# Patient Record
Sex: FEMALE | Race: WHITE | Employment: FULL TIME | URBAN - NONMETROPOLITAN AREA
[De-identification: names, ages, dates, MRNs, and addresses within clinical notes are randomized per-mention and may not be internally consistent; named-entity substitution may affect disease eponyms.]

---

## 2019-01-07 ENCOUNTER — HOSPITAL ENCOUNTER (OUTPATIENT)
Dept: ULTRASOUND IMAGING | Age: 29
Discharge: HOME | End: 2019-01-07
Payer: COMMERCIAL

## 2019-01-07 DIAGNOSIS — R10.9 LEFT SIDED ABDOMINAL PAIN: ICD-10-CM

## 2019-01-07 DIAGNOSIS — Z3A.26 26 WEEKS GESTATION OF PREGNANCY: ICD-10-CM

## 2019-01-07 DIAGNOSIS — R10.9 STOMACH ACHE: ICD-10-CM

## 2019-01-07 PROCEDURE — 76815 OB US LIMITED FETUS(S): CPT

## 2019-04-01 PROBLEM — O47.9 FALSE LABOR: Status: ACTIVE | Noted: 2019-04-01

## 2019-04-04 ENCOUNTER — APPOINTMENT (OUTPATIENT)
Dept: LABOR AND DELIVERY | Age: 29
End: 2019-04-04
Payer: COMMERCIAL

## 2019-04-04 ENCOUNTER — ANESTHESIA EVENT (OUTPATIENT)
Dept: LABOR AND DELIVERY | Age: 29
End: 2019-04-04
Payer: COMMERCIAL

## 2019-04-04 ENCOUNTER — ANESTHESIA (OUTPATIENT)
Dept: LABOR AND DELIVERY | Age: 29
End: 2019-04-04
Payer: COMMERCIAL

## 2019-04-04 ENCOUNTER — HOSPITAL ENCOUNTER (INPATIENT)
Age: 29
LOS: 2 days | Discharge: HOME | End: 2019-04-06
Attending: OBSTETRICS & GYNECOLOGY | Admitting: OBSTETRICS & GYNECOLOGY
Payer: COMMERCIAL

## 2019-04-04 LAB
ABO: NORMAL
AMPHETAMINE+METHAMPHETAMINE URINE SCREEN: NEGATIVE
ANTIBODY SCREEN: NORMAL
BARBITURATE QUANTITATIVE URINE: NEGATIVE
BASOPHILS # BLD: 0.4 %
BASOPHILS ABSOLUTE: 0 THOU/MM3 (ref 0–0.1)
BENZODIAZEPINE QUANTITATIVE URINE: NEGATIVE
CANNABINOID QUANTITATIVE URINE: NEGATIVE
COCAINE METABOLITE QUANTITATIVE URINE: NEGATIVE
EOSINOPHIL # BLD: 1.1 %
EOSINOPHILS ABSOLUTE: 0.1 THOU/MM3 (ref 0–0.4)
ERYTHROCYTE [DISTWIDTH] IN BLOOD BY AUTOMATED COUNT: 16.2 % (ref 11.5–14.5)
ERYTHROCYTE [DISTWIDTH] IN BLOOD BY AUTOMATED COUNT: 49.5 FL (ref 35–45)
HCT VFR BLD CALC: 33.3 % (ref 37–47)
HEMOGLOBIN: 10.2 GM/DL (ref 12–16)
IMMATURE GRANS (ABS): 0.05 THOU/MM3 (ref 0–0.07)
IMMATURE GRANULOCYTES: 0.4 %
LYMPHOCYTES # BLD: 19.9 %
LYMPHOCYTES ABSOLUTE: 2.2 THOU/MM3 (ref 1–4.8)
MCH RBC QN AUTO: 25.8 PG (ref 26–33)
MCHC RBC AUTO-ENTMCNC: 30.6 GM/DL (ref 32.2–35.5)
MCV RBC AUTO: 84.3 FL (ref 81–99)
MONOCYTES # BLD: 9.8 %
MONOCYTES ABSOLUTE: 1.1 THOU/MM3 (ref 0.4–1.3)
NUCLEATED RED BLOOD CELLS: 0 /100 WBC
OPIATES, URINE: NEGATIVE
OXYCODONE: NEGATIVE
PHENCYCLIDINE QUANTITATIVE URINE: NEGATIVE
PLATELET # BLD: 330 THOU/MM3 (ref 130–400)
PMV BLD AUTO: 9.9 FL (ref 9.4–12.4)
RBC # BLD: 3.95 MILL/MM3 (ref 4.2–5.4)
RH FACTOR: NORMAL
RPR: NONREACTIVE
SEG NEUTROPHILS: 68.4 %
SEGMENTED NEUTROPHILS ABSOLUTE COUNT: 7.6 THOU/MM3 (ref 1.8–7.7)
WBC # BLD: 11.1 THOU/MM3 (ref 4.8–10.8)

## 2019-04-04 PROCEDURE — 86900 BLOOD TYPING SEROLOGIC ABO: CPT

## 2019-04-04 PROCEDURE — 4A1HXCZ MONITORING OF PRODUCTS OF CONCEPTION, CARDIAC RATE, EXTERNAL APPROACH: ICD-10-PCS | Performed by: OBSTETRICS & GYNECOLOGY

## 2019-04-04 PROCEDURE — 86592 SYPHILIS TEST NON-TREP QUAL: CPT

## 2019-04-04 PROCEDURE — 80305 DRUG TEST PRSMV DIR OPT OBS: CPT

## 2019-04-04 PROCEDURE — 36415 COLL VENOUS BLD VENIPUNCTURE: CPT

## 2019-04-04 PROCEDURE — 86850 RBC ANTIBODY SCREEN: CPT

## 2019-04-04 PROCEDURE — 85025 COMPLETE CBC W/AUTO DIFF WBC: CPT

## 2019-04-04 PROCEDURE — 3E033VJ INTRODUCTION OF OTHER HORMONE INTO PERIPHERAL VEIN, PERCUTANEOUS APPROACH: ICD-10-PCS | Performed by: OBSTETRICS & GYNECOLOGY

## 2019-04-04 PROCEDURE — 3700000025 EPIDURAL BLOCK: Performed by: ANESTHESIOLOGY

## 2019-04-04 PROCEDURE — 10907ZC DRAINAGE OF AMNIOTIC FLUID, THERAPEUTIC FROM PRODUCTS OF CONCEPTION, VIA NATURAL OR ARTIFICIAL OPENING: ICD-10-PCS | Performed by: OBSTETRICS & GYNECOLOGY

## 2019-04-04 PROCEDURE — 2500000003 HC RX 250 WO HCPCS: Performed by: ANESTHESIOLOGY

## 2019-04-04 PROCEDURE — 1200000000 HC SEMI PRIVATE

## 2019-04-04 PROCEDURE — 86901 BLOOD TYPING SEROLOGIC RH(D): CPT

## 2019-04-04 PROCEDURE — 2709999900 HC NON-CHARGEABLE SUPPLY

## 2019-04-04 PROCEDURE — 6360000002 HC RX W HCPCS

## 2019-04-04 PROCEDURE — 2500000003 HC RX 250 WO HCPCS

## 2019-04-04 PROCEDURE — 7200000001 HC VAGINAL DELIVERY

## 2019-04-04 PROCEDURE — 6370000000 HC RX 637 (ALT 250 FOR IP): Performed by: OBSTETRICS & GYNECOLOGY

## 2019-04-04 PROCEDURE — 2580000003 HC RX 258: Performed by: OBSTETRICS & GYNECOLOGY

## 2019-04-04 RX ORDER — SODIUM CHLORIDE 0.9 % (FLUSH) 0.9 %
10 SYRINGE (ML) INJECTION EVERY 12 HOURS SCHEDULED
Status: DISCONTINUED | OUTPATIENT
Start: 2019-04-04 | End: 2019-04-04

## 2019-04-04 RX ORDER — DIPHENHYDRAMINE HYDROCHLORIDE 50 MG/ML
25 INJECTION INTRAMUSCULAR; INTRAVENOUS EVERY 4 HOURS PRN
Status: DISCONTINUED | OUTPATIENT
Start: 2019-04-04 | End: 2019-04-04 | Stop reason: HOSPADM

## 2019-04-04 RX ORDER — CARBOPROST TROMETHAMINE 250 UG/ML
250 INJECTION, SOLUTION INTRAMUSCULAR
Status: DISPENSED | OUTPATIENT
Start: 2019-04-04 | End: 2019-04-04

## 2019-04-04 RX ORDER — SODIUM CHLORIDE, SODIUM LACTATE, POTASSIUM CHLORIDE, CALCIUM CHLORIDE 600; 310; 30; 20 MG/100ML; MG/100ML; MG/100ML; MG/100ML
INJECTION, SOLUTION INTRAVENOUS CONTINUOUS
Status: DISCONTINUED | OUTPATIENT
Start: 2019-04-04 | End: 2019-04-06 | Stop reason: HOSPADM

## 2019-04-04 RX ORDER — ONDANSETRON 2 MG/ML
4 INJECTION INTRAMUSCULAR; INTRAVENOUS EVERY 6 HOURS PRN
Status: DISCONTINUED | OUTPATIENT
Start: 2019-04-04 | End: 2019-04-04

## 2019-04-04 RX ORDER — ONDANSETRON 2 MG/ML
4 INJECTION INTRAMUSCULAR; INTRAVENOUS EVERY 6 HOURS PRN
Status: DISCONTINUED | OUTPATIENT
Start: 2019-04-04 | End: 2019-04-06 | Stop reason: HOSPADM

## 2019-04-04 RX ORDER — MORPHINE SULFATE 2 MG/ML
2 INJECTION, SOLUTION INTRAMUSCULAR; INTRAVENOUS
Status: DISCONTINUED | OUTPATIENT
Start: 2019-04-04 | End: 2019-04-06 | Stop reason: HOSPADM

## 2019-04-04 RX ORDER — ONDANSETRON 4 MG/1
8 TABLET, FILM COATED ORAL EVERY 8 HOURS PRN
Status: DISCONTINUED | OUTPATIENT
Start: 2019-04-04 | End: 2019-04-06 | Stop reason: HOSPADM

## 2019-04-04 RX ORDER — MORPHINE SULFATE 4 MG/ML
4 INJECTION, SOLUTION INTRAMUSCULAR; INTRAVENOUS
Status: DISCONTINUED | OUTPATIENT
Start: 2019-04-04 | End: 2019-04-06 | Stop reason: HOSPADM

## 2019-04-04 RX ORDER — LANOLIN 100 %
OINTMENT (GRAM) TOPICAL PRN
Status: DISCONTINUED | OUTPATIENT
Start: 2019-04-04 | End: 2019-04-06 | Stop reason: HOSPADM

## 2019-04-04 RX ORDER — SODIUM CHLORIDE, SODIUM LACTATE, POTASSIUM CHLORIDE, CALCIUM CHLORIDE 600; 310; 30; 20 MG/100ML; MG/100ML; MG/100ML; MG/100ML
INJECTION, SOLUTION INTRAVENOUS CONTINUOUS
Status: DISCONTINUED | OUTPATIENT
Start: 2019-04-04 | End: 2019-04-04

## 2019-04-04 RX ORDER — CARBOPROST TROMETHAMINE 250 UG/ML
250 INJECTION, SOLUTION INTRAMUSCULAR PRN
Status: CANCELLED | OUTPATIENT
Start: 2019-04-04

## 2019-04-04 RX ORDER — ONDANSETRON 2 MG/ML
8 INJECTION INTRAMUSCULAR; INTRAVENOUS EVERY 6 HOURS PRN
Status: DISCONTINUED | OUTPATIENT
Start: 2019-04-04 | End: 2019-04-04 | Stop reason: HOSPADM

## 2019-04-04 RX ORDER — NALBUPHINE HCL 10 MG/ML
5 AMPUL (ML) INJECTION EVERY 4 HOURS PRN
Status: DISCONTINUED | OUTPATIENT
Start: 2019-04-04 | End: 2019-04-04 | Stop reason: HOSPADM

## 2019-04-04 RX ORDER — MORPHINE SULFATE 2 MG/ML
2 INJECTION, SOLUTION INTRAMUSCULAR; INTRAVENOUS
Status: DISCONTINUED | OUTPATIENT
Start: 2019-04-04 | End: 2019-04-04 | Stop reason: HOSPADM

## 2019-04-04 RX ORDER — TERBUTALINE SULFATE 1 MG/ML
0.25 INJECTION, SOLUTION SUBCUTANEOUS ONCE
Status: DISCONTINUED | OUTPATIENT
Start: 2019-04-04 | End: 2019-04-04 | Stop reason: HOSPADM

## 2019-04-04 RX ORDER — BUTORPHANOL TARTRATE 1 MG/ML
1 INJECTION, SOLUTION INTRAMUSCULAR; INTRAVENOUS
Status: DISCONTINUED | OUTPATIENT
Start: 2019-04-04 | End: 2019-04-04 | Stop reason: HOSPADM

## 2019-04-04 RX ORDER — LIDOCAINE HYDROCHLORIDE AND EPINEPHRINE 15; 5 MG/ML; UG/ML
INJECTION, SOLUTION EPIDURAL
Status: DISCONTINUED
Start: 2019-04-04 | End: 2019-04-04

## 2019-04-04 RX ORDER — ACETAMINOPHEN 325 MG/1
650 TABLET ORAL EVERY 4 HOURS PRN
Status: DISCONTINUED | OUTPATIENT
Start: 2019-04-04 | End: 2019-04-04 | Stop reason: HOSPADM

## 2019-04-04 RX ORDER — METHYLERGONOVINE MALEATE 0.2 MG/ML
200 INJECTION INTRAVENOUS
Status: ACTIVE | OUTPATIENT
Start: 2019-04-04 | End: 2019-04-04

## 2019-04-04 RX ORDER — LIDOCAINE HYDROCHLORIDE 10 MG/ML
30 INJECTION, SOLUTION EPIDURAL; INFILTRATION; INTRACAUDAL; PERINEURAL PRN
Status: DISCONTINUED | OUTPATIENT
Start: 2019-04-04 | End: 2019-04-04 | Stop reason: HOSPADM

## 2019-04-04 RX ORDER — SEVOFLURANE 250 ML/250ML
1 LIQUID RESPIRATORY (INHALATION) CONTINUOUS PRN
Status: DISCONTINUED | OUTPATIENT
Start: 2019-04-04 | End: 2019-04-04 | Stop reason: HOSPADM

## 2019-04-04 RX ORDER — METHYLERGONOVINE MALEATE 0.2 MG/ML
200 INJECTION INTRAVENOUS PRN
Status: DISCONTINUED | OUTPATIENT
Start: 2019-04-04 | End: 2019-04-04 | Stop reason: HOSPADM

## 2019-04-04 RX ORDER — HYDROCODONE BITARTRATE AND ACETAMINOPHEN 5; 325 MG/1; MG/1
1 TABLET ORAL EVERY 4 HOURS PRN
Status: DISCONTINUED | OUTPATIENT
Start: 2019-04-04 | End: 2019-04-06 | Stop reason: HOSPADM

## 2019-04-04 RX ORDER — HYDROCODONE BITARTRATE AND ACETAMINOPHEN 5; 325 MG/1; MG/1
2 TABLET ORAL EVERY 4 HOURS PRN
Status: DISCONTINUED | OUTPATIENT
Start: 2019-04-04 | End: 2019-04-06 | Stop reason: HOSPADM

## 2019-04-04 RX ORDER — EPHEDRINE SULFATE/0.9% NACL/PF 50 MG/5 ML
SYRINGE (ML) INTRAVENOUS
Status: COMPLETED
Start: 2019-04-04 | End: 2019-04-04

## 2019-04-04 RX ORDER — SODIUM CHLORIDE 0.9 % (FLUSH) 0.9 %
10 SYRINGE (ML) INJECTION PRN
Status: DISCONTINUED | OUTPATIENT
Start: 2019-04-04 | End: 2019-04-04

## 2019-04-04 RX ORDER — MISOPROSTOL 200 UG/1
800 TABLET ORAL
Status: ACTIVE | OUTPATIENT
Start: 2019-04-04 | End: 2019-04-04

## 2019-04-04 RX ORDER — FERROUS SULFATE 325(65) MG
325 TABLET ORAL
Status: DISCONTINUED | OUTPATIENT
Start: 2019-04-05 | End: 2019-04-06 | Stop reason: HOSPADM

## 2019-04-04 RX ORDER — DOCUSATE SODIUM 100 MG/1
100 CAPSULE, LIQUID FILLED ORAL 2 TIMES DAILY PRN
Status: DISCONTINUED | OUTPATIENT
Start: 2019-04-04 | End: 2019-04-06 | Stop reason: HOSPADM

## 2019-04-04 RX ORDER — NALOXONE HYDROCHLORIDE 0.4 MG/ML
0.4 INJECTION, SOLUTION INTRAMUSCULAR; INTRAVENOUS; SUBCUTANEOUS PRN
Status: DISCONTINUED | OUTPATIENT
Start: 2019-04-04 | End: 2019-04-04 | Stop reason: HOSPADM

## 2019-04-04 RX ORDER — MISOPROSTOL 200 UG/1
1000 TABLET ORAL PRN
Status: DISCONTINUED | OUTPATIENT
Start: 2019-04-04 | End: 2019-04-04 | Stop reason: HOSPADM

## 2019-04-04 RX ORDER — MORPHINE SULFATE 4 MG/ML
4 INJECTION, SOLUTION INTRAMUSCULAR; INTRAVENOUS
Status: DISCONTINUED | OUTPATIENT
Start: 2019-04-04 | End: 2019-04-04 | Stop reason: HOSPADM

## 2019-04-04 RX ORDER — IBUPROFEN 800 MG/1
800 TABLET ORAL EVERY 8 HOURS PRN
Status: DISCONTINUED | OUTPATIENT
Start: 2019-04-04 | End: 2019-04-04 | Stop reason: HOSPADM

## 2019-04-04 RX ORDER — LIDOCAINE HYDROCHLORIDE AND EPINEPHRINE 10; 10 MG/ML; UG/ML
INJECTION, SOLUTION INFILTRATION; PERINEURAL
Status: DISCONTINUED
Start: 2019-04-04 | End: 2019-04-04

## 2019-04-04 RX ORDER — ACETAMINOPHEN 325 MG/1
650 TABLET ORAL EVERY 4 HOURS PRN
Status: DISCONTINUED | OUTPATIENT
Start: 2019-04-04 | End: 2019-04-06 | Stop reason: HOSPADM

## 2019-04-04 RX ORDER — IBUPROFEN 800 MG/1
800 TABLET ORAL 3 TIMES DAILY
Status: DISCONTINUED | OUTPATIENT
Start: 2019-04-04 | End: 2019-04-06 | Stop reason: HOSPADM

## 2019-04-04 RX ADMIN — SODIUM CHLORIDE, POTASSIUM CHLORIDE, SODIUM LACTATE AND CALCIUM CHLORIDE: 600; 310; 30; 20 INJECTION, SOLUTION INTRAVENOUS at 08:30

## 2019-04-04 RX ADMIN — Medication 1 MILLI-UNITS/MIN: at 08:44

## 2019-04-04 RX ADMIN — SODIUM CHLORIDE, POTASSIUM CHLORIDE, SODIUM LACTATE AND CALCIUM CHLORIDE: 600; 310; 30; 20 INJECTION, SOLUTION INTRAVENOUS at 13:47

## 2019-04-04 RX ADMIN — DOCUSATE SODIUM 100 MG: 100 CAPSULE, LIQUID FILLED ORAL at 20:01

## 2019-04-04 RX ADMIN — IBUPROFEN 800 MG: 800 TABLET, FILM COATED ORAL at 20:35

## 2019-04-04 RX ADMIN — Medication 999 MILLI-UNITS/MIN: at 15:22

## 2019-04-04 RX ADMIN — Medication 10 MG: at 11:40

## 2019-04-04 RX ADMIN — SODIUM CHLORIDE, POTASSIUM CHLORIDE, SODIUM LACTATE AND CALCIUM CHLORIDE: 600; 310; 30; 20 INJECTION, SOLUTION INTRAVENOUS at 12:17

## 2019-04-04 RX ADMIN — SODIUM CHLORIDE, POTASSIUM CHLORIDE, SODIUM LACTATE AND CALCIUM CHLORIDE: 600; 310; 30; 20 INJECTION, SOLUTION INTRAVENOUS at 11:17

## 2019-04-04 RX ADMIN — Medication 18 ML/HR: at 11:15

## 2019-04-04 SDOH — HEALTH STABILITY: MENTAL HEALTH: HOW OFTEN DO YOU HAVE A DRINK CONTAINING ALCOHOL?: NEVER

## 2019-04-04 NOTE — FLOWSHEET NOTE
Pt arrived to room 5a16 per wc with infant in arms. Oriented to room and plan of care discussed. Pt denies needs, will call for next void. Call light is within reach.

## 2019-04-04 NOTE — PLAN OF CARE
Problem:  Screening:  Goal: Ability to make informed decisions regarding treatment has improved  Description  Ability to make informed decisions regarding treatment has improved  Outcome: Met This Shift  Note:   NA     Problem:  Screening:  Goal: Ability to make informed decisions regarding treatment has improved  Description  Ability to make informed decisions regarding treatment has improved  Outcome: Met This Shift  Note:   NA     Problem: Anxiety:  Goal: Level of anxiety will decrease  Description  Level of anxiety will decrease  Outcome: Ongoing  Note:   Pt appears calm and relaxed     Problem: Breathing Pattern - Ineffective:  Goal: Able to breathe comfortably  Description  Able to breathe comfortably  Outcome: Ongoing  Note:   Resp easy and regular     Problem: Fluid Volume - Imbalance:  Goal: Absence of intrapartum hemorrhage signs and symptoms  Description  Absence of intrapartum hemorrhage signs and symptoms  Outcome: Ongoing  Note:   Vitals stable. No bleeding noted. Problem: Infection - Intrapartum Infection:  Goal: Will show no infection signs and symptoms  Description  Will show no infection signs and symptoms  Outcome: Ongoing  Note:   Vitals stable. afebrile     Problem: Labor Process - Prolonged:  Goal: Uterine contractions within specified parameters  Description  Uterine contractions within specified parameters  Outcome: Ongoing  Note:   Irreg contractions at present. Pitocin induction, Will continue to monitor. Problem: Pain - Acute:  Goal: Pain level will decrease  Description  Pain level will decrease  Outcome: Ongoing  Note:   Denies need for pain meds at this time. Planning epidural. Has pain goal of 5. Problem: Tissue Perfusion - Uteroplacental, Altered:  Goal: Absence of abnormal fetal heart rate pattern  Description  Absence of abnormal fetal heart rate pattern  Outcome: Ongoing  Note:   Has reactive strip.      Problem: Urinary Retention:  Goal: Urinary elimination within specified parameters  Description  Urinary elimination within specified parameters  Outcome: Ongoing  Note:   Voiding qs     Problem: Falls - Risk of:  Goal: Will remain free from falls  Description  Will remain free from falls  Outcome: Ongoing  Note:   SR up times 2. Call light within reach, Family at bedside. Problem: Discharge Planning:  Goal: Discharged to appropriate level of care  Description  Discharged to appropriate level of care  Outcome: Ongoing  Note:   Planning TRO to mom baby after delivery   Care plan reviewed with patient and husb. Patient and husb verbalize understanding of the plan of care and contribute to goal setting.

## 2019-04-04 NOTE — FLOWSHEET NOTE
1452 to 1458 prolonged decl. Noted. Iv bolus given positioned to right and then to left. O2 started at 10 liters at 1454. To back at 1458. Legs up in stirrups.  [address] [facility] dr Alicia Baeza here

## 2019-04-04 NOTE — H&P
Kelsey Luong is a 29 y.o. female patient. No diagnosis found. Past Medical History:   Diagnosis Date    Anemia     on iron    Fibroid tumor breast     Hypertension     off work at 39 wks on no meds     OB History        4    Para   1    Term   2            AB   1    Living   2       SAB   1    TAB        Ectopic        Molar        Multiple        Live Births   2              39w4d  Estimated Date of Delivery: 19  No Known Allergies  Active Problems:    * No active hospital problems. *  Resolved Problems:    * No resolved hospital problems. *    Blood pressure 127/84, pulse 81, temperature 97.2 °F (36.2 °C), temperature source Oral, resp. rate 20, SpO2 98 %, unknown if currently breastfeeding. Maternal Medical History:   Reason for admission: 39 week IOL    Contractions: Onset was 3-5 hours ago. Fetal activity: Perceived fetal activity is normal.    Last perceived fetal movement was within the past hour. Prenatal complications: no prenatal complications  Prenatal Complications - Diabetes: none. Maternal Exam:   Uterine Assessment: Contraction strength is mild. Contraction frequency is regular. Abdomen: Fetal presentation: vertex    Introitus: Normal vulva. Normal vagina. Pelvis: adequate for delivery. Cervix: Cervix evaluated by digital exam.    5/70/-2 AROM clear    Fetal Exam  Fetal Monitor Review: Mode: ultrasound. Baseline rate: 140. Variability: moderate (6-25 bpm). Pattern: accelerations present. Fetal State Assessment: Category I - tracings are normal.          Assessment:  Active phase labor. Membrane status: AROM.    Fetal well-being: normal.     Plan:  Admit to L&D  Anticipate IVONNE Francis DO  2019

## 2019-04-04 NOTE — ANESTHESIA PRE PROCEDURE
Department of Anesthesiology  Preprocedure Note       Name:  Madyson Castellano   Age:  29 y.o.  :  1990                                          MRN:  [id number]         Date:  2019      Surgeon: * No surgeons listed *    Procedure: ANESTHESIA LABOR ANALGESIA    Medications prior to admission:   Prior to Admission medications    Medication Sig Start Date End Date Taking? Authorizing Provider   ferrous sulfate 325 (65 Fe) MG tablet Take 325 mg by mouth daily (with breakfast)   Yes Historical Provider, MD   Prenatal MV-Min-Fe Fum-FA-DHA (PRENATAL 1 PO) Take  by mouth. Yes Historical Provider, MD   acetaminophen (TYLENOL) 500 MG tablet Take 500 mg by mouth every 6 hours as needed for Pain.    Yes Historical Provider, MD       Current medications:    Current Facility-Administered Medications   Medication Dose Route Frequency Provider Last Rate Last Dose    lactated ringers infusion   Intravenous Continuous Carrie Osier,  mL/hr at 19 1117      lidocaine PF 1 % injection 30 mL  30 mL Other PRN Carrie Osier, DO        butorphanol (STADOL) injection 1 mg  1 mg Intravenous Q1H PRN Carrie Osier, DO        nitrous oxide 50% inhalation 1 each  1 each Inhalation Continuous PRN Carrie Osier, DO        acetaminophen (TYLENOL) tablet 650 mg  650 mg Oral Q4H PRN Carrie Osier, DO        ibuprofen (ADVIL;MOTRIN) tablet 800 mg  800 mg Oral Q8H PRN Carrie Osier, DO        morphine (PF) injection 2 mg  2 mg Intravenous Q2H PRN Carrie Osier, DO        Or    morphine injection 4 mg  4 mg Intravenous Q2H PRN Carrie Osier, DO        oxytocin (PITOCIN) 30 units in 500 mL infusion  1 talon-units/min Intravenous Continuous Carrie Osier, DO 4 mL/hr at 19 0945 4 talon-units/min at 19 0945    diphenhydrAMINE (BENADRYL) injection 25 mg  25 mg Intravenous Q4H PRN Carrie Osier, DO        [facility]dansetron [facility]) injection 8 mg  8 mg Intravenous Q6H PRN Kathalene Plump, DO        terbutaline (BRETHINE) injection 0.25 mg  0.25 mg Subcutaneous Once Kathalene Plump, DO        oxytocin (PITOCIN) 30 units in 500 mL infusion  1 talon-units/min Intravenous Continuous PRN Kathalene Plump, DO        methylergonovine (METHERGINE) injection 200 mcg  200 mcg Intramuscular PRN Kathalene Plump, DO        misoprostol (CYTOTEC) tablet 1,000 mcg  1,000 mcg Rectal PRN Kathalene Plump, DO        witch hazel-glycerin (TUCKS) pad   Topical PRN Kathalene Plump, DO        benzocaine-menthol (DERMOPLAST) 20-0.5 % spray   Topical PRN Kathalene Plump, DO        lidocaine-EPINEPHrine 1 percent-1:[id number] injection             Lidocaine-EPINEPHrine 1.5 %-1:120801             naloxone (NARCAN) injection 0.4 mg  0.4 mg Intravenous PRN Kannan Guard, DO        nalbuphine (NUBAIN) injection 5 mg  5 mg Intravenous Q4H PRN Kannan Guard, DO        [facility]etron [facility]) injection 4 mg  4 mg Intravenous Q6H PRN Kannan Guard, DO        fentaNYL 750 mcg, ropivacaine 0.1% in sodium chloride 0.9% 265mL (OB) epidural  18 mL/hr Epidural Continuous Kannan Guard, DO 18 mL/hr at 04/04/19 1115 18 mL/hr at 04/04/19 1115       Allergies:  No Known Allergies    Problem List:    Patient Active Problem List   Diagnosis Code    Supervision of normal pregnancy Z34.90    False labor O47.9       Past Medical History:        Diagnosis Date    Anemia     on iron    Fibroid tumor breast     Hypertension     off work at 37 wks on no meds       Past Surgical History:  History reviewed. No pertinent surgical history.     Social History:    Social History     Tobacco Use    Smoking status: Never Smoker    Smokeless tobacco: Never Used   Substance Use Topics    Alcohol use: No     Frequency: Never                                Counseling given: Not Answered      Vital Signs (Current):   Vitals:    04/04/19 1123 04/04/19 1127 04/04/19 1132 04/04/19 1138   BP: (!) 124/59 (!) 95/56     Pulse: 111 111 Resp: 18 20 18 18   Temp:       TempSrc:       SpO2:                                                  BP Readings from Last 3 Encounters:   04/04/19 (!) 95/56   04/01/19 126/83       NPO Status: Time of last liquid consumption: 2300                        Time of last solid consumption: 2045                        Date of last liquid consumption: 04/03/19                        Date of last solid food consumption: 04/03/19    BMI:   Wt Readings from Last 3 Encounters:   04/01/19 198 lb (89.8 kg)     There is no height or weight on file to calculate BMI.    CBC:   Lab Results   Component Value Date    WBC 11.1 04/04/2019    RBC 3.95 04/04/2019    HGB 10.2 04/04/2019    HCT 33.3 04/04/2019    MCV 84.3 04/04/2019    RDW 13.6 03/10/2015     04/04/2019       CMP: No results found for: NA, K, CL, CO2, BUN, CREATININE, GFRAA, AGRATIO, LABGLOM, GLUCOSE, PROT, CALCIUM, BILITOT, ALKPHOS, AST, ALT    POC Tests: No results for input(s): POCGLU, POCNA, POCK, POCCL, POCBUN, POCHEMO, POCHCT in the last 72 hours. Coags: No results found for: PROTIME, INR, APTT    HCG (If Applicable):   Lab Results   Component Value Date    PREGTESTUR POSITIVE 07/10/2014        ABGs: No results found for: PHART, PO2ART, OWQ0LDO, WAK5BEV, BEART, C5ZWUINN     Type & Screen (If Applicable):  Lab Results   Component Value Date    [address] POS 04/04/2019       Anesthesia Evaluation  Patient summary reviewed  Airway: Mallampati: I  TM distance: >3 FB   Neck ROM: full  Mouth opening: > = 3 FB Dental: normal exam         Pulmonary:Negative Pulmonary ROS and normal exam                               Cardiovascular:    (+) hypertension: mild,                   Neuro/Psych:   Negative Neuro/Psych ROS              GI/Hepatic/Renal: Neg GI/Hepatic/Renal ROS            Endo/Other: Negative Endo/Other ROS                    Abdominal:           Vascular: negative vascular ROS.                                        Anesthesia Plan      epidural     ASA 2 Anesthetic plan and risks discussed with patient and spouse. Plan discussed with attending.     Attending anesthesiologist reviewed and agrees with 1160 JUNIOR Briceno - CRNA   4/4/2019

## 2019-04-04 NOTE — ANESTHESIA PROCEDURE NOTES
Epidural Block    Patient location during procedure: OB  Start time: 4/4/2019 11:05 AM  End time: 4/4/2019 11:13 AM  Reason for block: labor epidural  Staffing  Anesthesiologist: Judy Rothman DO  Resident/CRNA: JUNIOR Matthews - CRNA  Performed: resident/CRNA   Preanesthetic Checklist  Completed: patient identified, site marked, surgical consent, pre-op evaluation, timeout performed, IV checked, risks and benefits discussed, monitors and equipment checked, anesthesia consent given, oxygen available and patient being monitored  Epidural  Patient position: sitting  Prep: ChloraPrep  Patient monitoring: continuous pulse ox and frequent blood pressure checks  Approach: midline  Location: lumbar (1-5)  Injection technique: ROSANNE saline  Provider prep: mask and sterile gloves  Needle  Catheter type: side hole  Catheter at skin depth: 13 cm  Test dose: negative

## 2019-04-04 NOTE — FLOWSHEET NOTE
BP 84/49, 90/53 fht 60. Iv bolus continues. Remi Bennett CRNA at bedside. Epidural pump paused. To right side tilt. ePHEDrine 10mg given IV.

## 2019-04-04 NOTE — L&D DELIVERY NOTE
Department of Obstetrics and Gynecology  Spontaneous Vaginal Delivery Note      Pt Name: Madyson Castellano  MRN: [id number] [place] #: [de-identified]  YOB: 1990  Procedure Performed By: Carrie Jean D.O.        Pre-operative Diagnosis:  Term pregnancy, Induced labor, Single fetus and Uncomplicated pregnancy    Post-operative Diagnosis: Same, delivered. Procedure:  Spontaneous vaginal delivery    Surgeon:  Ignacio Zabala D.O. Information for the patient's :  Lico Renteria [[id number]]          Anesthesia:  epidural anesthesia    Estimated blood loss:  300ml    Specimen:  Placenta not sent to pathology     Complications:  none    Condition:  infant stable to general nursery and mother stable    Details of Procedure: The patient is a 29 y.o. female at 43w3d   OB History        4    Para   1    Term   2            AB   1    Living   2       SAB   1    TAB        Ectopic        Molar        Multiple        Live Births   2             who was admitted for active phase labor. She received the following interventions: ARBOW and IV Pitocin induction. The patient progressed well,did receive an epidural, became complete and started to push. She was placed in the dorsal lithotomy position and prepped. She delivered the fetal head in the vertex position over an intact perineum , the rest of the infant delivered easily, and was placed on the maternal abdomen for recovery. The nose and mouth were suctioned with bulb suction. Cord was cut and cord blood was obtained for type and rosemary. The placenta delivered intact, whole and with a centrally inserted three vessel cord. The perineum and vagina were explored and no lacerations were found. Ignacio Zabala D.O.     Mother's Information    Labor Events     labor?:  No  Rupture type:  Artificial=AROM, Intact  Fluid color:  Clear  Fluid odor:  None     Mother Delivery Information    Lacerations:  None  Repair Suture:  None  Surgical or Additional Est. Blood Loss (mL):  0 (View Only):  Edit in Flowsheets   Combined Est. Blood Loss (mL):  0        Peggy Hutchinson [[id number]]    Labor Events     labor?:  No   steroids?:  None  Cervical ripening date/time:     Antibiotics received during labor?:  No  Rupture date/time: 19 10:45:00   Rupture type:  Artificial=AROM  Fluid color:  Clear  Fluid odor:  None  Induction:  Oxytocin  Indications for induction:  Elective  Labor complications:  Fetal Intolerance          Labor Event Times    Labor onset date/time: 19 1015   Dilation complete date/time:   19 1550   Start pushin2019 1459   Decision time (emergent ): Anesthesia    Method:  Epidural     Assisted Delivery Details    Forceps attempted?:  No  Vacuum extractor attempted?:  No     Document Additional Attempt       Document Additional Attempt             Shoulder Dystocia    Shoulder dystocia present?:  No  Add Second Maneuver  Add Third Maneuver  Add Fourth Maneuver  Add Fifth Maneuver  Add Sixth Maneuver  Add Seventh Maneuver  Add Eighth Maneuver  Add Ninth Maneuver     Foster Presentation    Presentation:  Vertex  Position:  Left  _:  Occiput  _:  Anterior     Foster Information    Head delivery date/time:  2019 15:04:00   Changing the 's delivery date/time could affect patient care.:     Delivery date/time:   19 1504   Delivery type:  Vaginal, Spontaneous    Details:         Delivery Providers    Delivering clinician:  Carrie Jean DO   Provider Role    Sharan Pastrana RN Delivery Nurse    Betty Vivar, RN Registered Nurse    TIFFANIE UribeP Respiratory Therapist (Day)      Cord    Vessels:  3 Vessels  Complications:  None  Delayed cord clamping?:  Yes  Cord clamped date/time:  2019 1505  Cord blood disposition:  Lab  Stem cell collection (by provider):   No     Placenta    Date/time:  2019 15:14:14     Delivery Resuscitation    Method:  Bulb Suction, Stimulation     Apgars    Living status:  Living  Apgars   1 Minute:   5 Minute:   10 Minute 15 Minute 20 Minute   Skin Color: 1  1       Heart Rate: 2  2       Reflex Irritability: 2  2       Muscle Tone: 2  2       Respiratory Effort: 2  2       Total: 9  9               Apgars Assigned By:  Harper Bass     Skin to Skin    Skin to skin initiation date/time: 19 15:07:00   Skin to skin with:   Mother  Skin to skin end date/time:        [place] Measurements       Delivery Information    Perineal lacerations:  None    Vaginal laceration:  No    Cervical laceration:  No    Surgical or additional est. blood loss (mL):  0 (View Only):  Edit in Flowsheets   Combined est. blood loss (mL):  0  Repair suture:  None     Vaginal Delivery Counts    Initial count personnel:  10 SPONGES,1 NEEDLE,16 INST Ciara Isaac RNC  Initial count verified by:  DR. PALACIOS   4x4:   Needles:   Instruments:   Lap Pads:   Sponges:     Initial counts:          Final counts:             Other Procedures    Procedures:  None

## 2019-04-04 NOTE — FLOWSHEET NOTE
Dr. Malvin Parker notified of pt getting epidural and BP dropping, prolonged decel, ePHEDrine given and has reactive strip now, To continue with current plan of care.

## 2019-04-05 LAB
BASOPHILS # BLD: 0.3 %
BASOPHILS ABSOLUTE: 0 THOU/MM3 (ref 0–0.1)
EOSINOPHIL # BLD: 1.4 %
EOSINOPHILS ABSOLUTE: 0.2 THOU/MM3 (ref 0–0.4)
ERYTHROCYTE [DISTWIDTH] IN BLOOD BY AUTOMATED COUNT: 16.4 % (ref 11.5–14.5)
ERYTHROCYTE [DISTWIDTH] IN BLOOD BY AUTOMATED COUNT: 49.8 FL (ref 35–45)
HCT VFR BLD CALC: 29.8 % (ref 37–47)
HEMOGLOBIN: 9 GM/DL (ref 12–16)
IMMATURE GRANS (ABS): 0.1 THOU/MM3 (ref 0–0.07)
IMMATURE GRANULOCYTES: 0.7 %
LYMPHOCYTES # BLD: 20 %
LYMPHOCYTES ABSOLUTE: 3 THOU/MM3 (ref 1–4.8)
MCH RBC QN AUTO: 25.6 PG (ref 26–33)
MCHC RBC AUTO-ENTMCNC: 30.2 GM/DL (ref 32.2–35.5)
MCV RBC AUTO: 84.7 FL (ref 81–99)
MONOCYTES # BLD: 11.7 %
MONOCYTES ABSOLUTE: 1.8 THOU/MM3 (ref 0.4–1.3)
NUCLEATED RED BLOOD CELLS: 0 /100 WBC
PLATELET # BLD: 281 THOU/MM3 (ref 130–400)
PMV BLD AUTO: 10.4 FL (ref 9.4–12.4)
RBC # BLD: 3.52 MILL/MM3 (ref 4.2–5.4)
SEG NEUTROPHILS: 65.9 %
SEGMENTED NEUTROPHILS ABSOLUTE COUNT: 9.9 THOU/MM3 (ref 1.8–7.7)
WBC # BLD: 15 THOU/MM3 (ref 4.8–10.8)

## 2019-04-05 PROCEDURE — 36415 COLL VENOUS BLD VENIPUNCTURE: CPT

## 2019-04-05 PROCEDURE — 85025 COMPLETE CBC W/AUTO DIFF WBC: CPT

## 2019-04-05 PROCEDURE — 2709999900 HC NON-CHARGEABLE SUPPLY

## 2019-04-05 PROCEDURE — 6370000000 HC RX 637 (ALT 250 FOR IP): Performed by: OBSTETRICS & GYNECOLOGY

## 2019-04-05 PROCEDURE — 1200000000 HC SEMI PRIVATE

## 2019-04-05 RX ADMIN — ACETAMINOPHEN 650 MG: 325 TABLET ORAL at 12:40

## 2019-04-05 RX ADMIN — FERROUS SULFATE TAB 325 MG (65 MG ELEMENTAL FE) 325 MG: 325 (65 FE) TAB at 07:55

## 2019-04-05 RX ADMIN — IBUPROFEN 800 MG: 800 TABLET, FILM COATED ORAL at 17:04

## 2019-04-05 RX ADMIN — DOCUSATE SODIUM 100 MG: 100 CAPSULE, LIQUID FILLED ORAL at 20:36

## 2019-04-05 RX ADMIN — DOCUSATE SODIUM 100 MG: 100 CAPSULE, LIQUID FILLED ORAL at 07:55

## 2019-04-05 RX ADMIN — IBUPROFEN 800 MG: 800 TABLET, FILM COATED ORAL at 07:55

## 2019-04-05 ASSESSMENT — PAIN SCALES - GENERAL
PAINLEVEL_OUTOF10: 0
PAINLEVEL_OUTOF10: 4
PAINLEVEL_OUTOF10: 2
PAINLEVEL_OUTOF10: 3

## 2019-04-05 NOTE — PLAN OF CARE
Problem: Fluid Volume - Imbalance:  Goal: Absence of postpartum hemorrhage signs and symptoms  Description  Absence of postpartum hemorrhage signs and symptoms   4/5/2019 1121 by Laurita Baum RN  Outcome: Ongoing  Note:   Pt having small amount of lochia, no clots     Problem: Infection - Intrapartum Infection:  Goal: Will show no infection signs and symptoms  Description  Will show no infection signs and symptoms   4/5/2019 1121 by Laurita Baum RN  Outcome: Ongoing  Note:   Pt afebrile, no s/s of infection     Problem: Pain - Acute:  Goal: Pain level will decrease  Description  Pain level will decrease   4/5/2019 1121 by Laurita Baum RN  Outcome: Ongoing  Note:   Patient reports abdominal pain and is being controlled with motrin . Pain goal is a 5. Pain goal is met       Problem: Discharge Planning:  Goal: Discharged to appropriate level of care  Description  Discharged to appropriate level of care   4/5/2019 1121 by Laurita Baum RN  Outcome: Ongoing  Note:   No discharge needs voiced from patient at this time. Patient expected to be discharged home with family.        Problem: Infection - Surgical Site:  Goal: Will show no infection signs and symptoms  Description  Will show no infection signs and symptoms   4/5/2019 1121 by Laurita Baum RN  Outcome: Ongoing  Note:   Pt afebrile, no s/s of infection     Problem: Mood - Altered:  Goal: Mood stable  Description  Mood stable   4/5/2019 1121 by Laurita Baum RN  Outcome: Ongoing  Note:   Pt calm, expressing needs     Problem: Constipation:  Goal: Bowel elimination is within specified parameters  Description  Bowel elimination is within specified parameters  4/5/2019 1121 by Laurita Baum RN  Outcome: Ongoing  Note:   Pt passing flatus, taking colace     Problem: Infection - Risk of, Puerperal Infection:  Goal: Will show no infection signs and symptoms  Description  Will show no infection signs and symptoms   4/5/2019 1121 by Paolo Hickman Isa RN  Outcome: Ongoing  Note:   Pt afebrile, no s/s of infection     Problem: Pain:  Goal: Pain level will decrease  Description  Pain level will decrease   4/5/2019 1121 by Jose Alberto Jimenez RN  Outcome: Ongoing  Note:   Patient reports abdominal pain and is being controlled with motrin . Pain goal is a 5. Pain goal is met     Care plan reviewed with patient. Patient verbalizes understanding of the plan of care and contribute to goal setting.

## 2019-04-05 NOTE — PLAN OF CARE
Problem: Fluid Volume - Imbalance:  Goal: Absence of postpartum hemorrhage signs and symptoms  Description  Absence of postpartum hemorrhage signs and symptoms   4/5/2019 0014 by Diomedes Sanchez RN  Outcome: Ongoing  Note:   Small amount of lochia with small clot passed     Problem: Infection - Intrapartum Infection:  Goal: Will show no infection signs and symptoms  Description  Will show no infection signs and symptoms   4/5/2019 0014 by Diomedes Sanchez RN  Outcome: Ongoing  Note:   No signs of infection     Problem: Pain - Acute:  Goal: Pain level will decrease  Description  Pain level will decrease   4/5/2019 0014 by Diomedes Sanchez RN  Outcome: Ongoing  Note:   States relief with oral pain medication     Problem: Discharge Planning:  Goal: Discharged to appropriate level of care  Description  Discharged to appropriate level of care   4/5/2019 0014 by Diomedes Sanchez RN  Outcome: Ongoing  Note:   Discharge not anticipated today           Problem: Mood - Altered:  Goal: Mood stable  Description  Mood stable   4/5/2019 0014 by Diomedes Sanchez RN  Outcome: Ongoing  Note:   Pleasant and cooperative     Problem: Constipation:  Goal: Bowel elimination is within specified parameters  Description  Bowel elimination is within specified parameters  Outcome: Ongoing  Note:   Taking stool softeners     Problem: Infection - Risk of, Puerperal Infection:  Goal: Will show no infection signs and symptoms  Description  Will show no infection signs and symptoms   4/5/2019 0014 by Diomedes Sanchez RN  Outcome: Ongoing  Note:   No signs of infection     Care plan reviewed with patient and SO. Patient and SO verbalize understanding of the plan of care and contribute to goal setting.

## 2019-04-05 NOTE — LACTATION NOTE
Rounding to review breastfeeding progress. Patient stated feeding has been going well but baby sleepy this afternoon. Patient denied questions or needs at this time. Baby to [facility] for circumcision. Encouraged STS care and on-cue feedings when baby returns to the room. LC to continue to follow.   Ofelia CHERRY, [employer] [facility]

## 2019-04-05 NOTE — PROGRESS NOTES
Department of Obstetrics and Gynecology  Labor and Delivery  Post Partum Day #1      SUBJECTIVE:  Patient feeling well with no complaints. Breastfeeding without difficulty. Mild lochia. Patient denies pain. Urination without difficulty. Positive flatus. OBJECTIVE:/76   Pulse 81   Temp 97.8 °F (36.6 °C) (Oral)   Resp 16   SpO2 (!) 87%   Breastfeeding? Unknown    ABDOMEN:  Soft, non-tender, fundus firm. GENITAL/URINARY:  Normal post partum findings. Extremities: warm and dry. No edema    DATA:    Lab Results   Component Value Date    HGB 9.0 04/05/2019    HCT 29.8 04/05/2019       ASSESSMENT & PLAN:    Normal post partum day 1 exam. Plan as per orders.        Electronically signed by JUNIOR Talley CNP on 4/5/2019 at 11:14 AM
Infant has roomed in with mother this shift. Benefits of rooming in discussed.
2.5

## 2019-04-05 NOTE — DISCHARGE SUMMARY
Obstetric Discharge Summary      Pt Name: Robert Jara  MRN: [id number] [place] #: [de-identified]  YOB: 1990        Admitting Diagnosis  IUP  OB History        4    Para   2    Term   3            AB   1    Living   3       SAB   1    TAB        Ectopic        Molar        Multiple   0    Live Births   3                Reasons for Admission on 2019  8:03 AM  Normal vaginal delivery [O80]  No comment available  Vaginal Delivery      Postpartum/Operative Complications       [place] Data  Information for the patient's :  Gema Winn [[id number]]   male  Birth Weight: 8 lb 4.5 oz (3.755 kg)      Discharge Diagnosis  Postpartum, Vaginal Delivery    Discharge Information  Current Discharge Medication List      CONTINUE these medications which have NOT CHANGED    Details   ferrous sulfate 325 (65 Fe) MG tablet Take 325 mg by mouth daily (with breakfast)      Prenatal MV-Min-Fe Fum-FA-DHA (PRENATAL 1 PO) Take  by mouth. acetaminophen (TYLENOL) 500 MG tablet Take 500 mg by mouth every 6 hours as needed for Pain.                  Vaginal Delivery  Diet regular    Condition: Stable  Discharge to:  home  Follow up in 4 weeks    Patient to be discharged on 19    Electronically signed by JUNIOR Wu CNP on 2019 at 11:17 AM

## 2019-04-06 VITALS
RESPIRATION RATE: 16 BRPM | DIASTOLIC BLOOD PRESSURE: 78 MMHG | OXYGEN SATURATION: 87 % | HEART RATE: 86 BPM | TEMPERATURE: 98 F | SYSTOLIC BLOOD PRESSURE: 131 MMHG

## 2019-04-06 PROCEDURE — 6370000000 HC RX 637 (ALT 250 FOR IP): Performed by: OBSTETRICS & GYNECOLOGY

## 2019-04-06 PROCEDURE — 2709999900 HC NON-CHARGEABLE SUPPLY

## 2019-04-06 RX ADMIN — DOCUSATE SODIUM 100 MG: 100 CAPSULE, LIQUID FILLED ORAL at 08:39

## 2019-04-06 RX ADMIN — IBUPROFEN 800 MG: 800 TABLET, FILM COATED ORAL at 05:49

## 2019-04-06 ASSESSMENT — PAIN SCALES - GENERAL: PAINLEVEL_OUTOF10: 3

## 2019-04-06 NOTE — FLOWSHEET NOTE
Discharge instructions given. See AVS. Pt verbalized understanding. Postpartum  teaching completed and forms signed by patient. Copy witnessed by RN and given to patient. Patient verbalized understanding of all teaching points. Patient plans to follow-up with [place] Provider as instructed. Patient verbalizes understanding of discharge instructions and denies further questions. ID bands checked. Patient discharged in stable condition accompanied by family/guardian. Discharged in wheelchair, holding baby in arms.

## 2019-04-06 NOTE — PLAN OF CARE
Problem: Fluid Volume - Imbalance:  Goal: Absence of postpartum hemorrhage signs and symptoms  Description  Absence of postpartum hemorrhage signs and symptoms   4/5/2019 2313 by Angel Serrano RN  Outcome: Ongoing  Note:   Small amount of lochia on pad, no clots noted       Problem: Pain - Acute:  Goal: Pain level will decrease  Description  Pain level will decrease   4/5/2019 2313 by Angel Serrano RN  Outcome: Ongoing  Note:   Denies any pain at this time. PO pain medications ordered if needed. Pain goal 5 out of 10     Problem: Discharge Planning:  Goal: Discharged to appropriate level of care  Description  Discharged to appropriate level of care   4/5/2019 2313 by Angel Serrano RN  Outcome: Ongoing  Note:   Discharge not anticipated this shift. Plan of care discussed. Problem: Mood - Altered:  Goal: Mood stable  Description  Mood stable   4/5/2019 2313 by Angel Serrano RN  Outcome: Ongoing  Note:   Calm and cooperative, bonding well with infant       Problem: Constipation:  Goal: Bowel elimination is within specified parameters  Description  Bowel elimination is within specified parameters  4/5/2019 2313 by Angel Serrano RN  Outcome: Ongoing  Note:   Colace given, pt states passing flatulence. Encouraged fluids, ambulation, and high fiber meals. Problem: Pain:  Goal: Pain level will decrease  Description  Pain level will decrease   4/5/2019 2313 by Angel Serrano RN  Outcome: Ongoing  Note:   Denies any pain at this time. PO pain medications ordered if needed. Pain goal 5 out of 10     Problem: Pain:  Goal: Control of acute pain  Description  Control of acute pain  4/5/2019 2313 by Angel Serrano RN  Outcome: Ongoing  Note:   Denies any pain at this time. PO pain medications ordered if needed.  Pain goal 5 out of 10     Problem: Infection - Intrapartum Infection:  Goal: Will show no infection signs and symptoms  Description  Will show no infection signs and symptoms

## 2019-04-06 NOTE — PLAN OF CARE
Problem: Fluid Volume - Imbalance:  Goal: Absence of postpartum hemorrhage signs and symptoms  Description  Absence of postpartum hemorrhage signs and symptoms   4/6/19 1018 by Zaki Gautam RN  Outcome: Met  Note:   Small amount of lochia         Problem: Pain - Acute:  Goal: Pain level will decrease  Description  Pain level will decrease   4/6/2019 1018 by Zaki Gautam RN  Outcome: met  Note:   Pain managed with  pain medications. Pain goal 5 out of 10     Problem: Discharge Planning:  Goal: Discharged to appropriate level of care  Description  Discharged to appropriate level of care   4/6/19 1018 by Zaki Gautam RN  Outcome: met  Note:   Discharge today.        Problem: Mood - Altered:  Goal: Mood stable  Description  Mood stable   4/6/2019 1018 by Zaki Gautam RN  Outcome: Met  Note:   Calm and cooperative, bonding well with infant        Problem: Constipation:  Goal: Bowel elimination is within specified parameters  Description  Bowel elimination is within specified parameters  4/6/2019 1018 by Zaki Gautam RN  Outcome: met  Note:   Colace given. Encouraged fluids, ambulation, and high fiber meals. Problem: Pain:  Goal: Pain level will decrease  Description  Pain level will decrease   4/6/2019 1018 by Zaki Gautam RN  Outcome: Met  Note:   Pain managed with pain medications. Pain goal 5 out of 10     Problem: Pain:  Goal: Control of acute pain  Description  Control of acute pain  4/6/20191018 by Zaki Gautam RN  Outcome: met  Note:   Pain managed with pain medications Pain goal 5 out of 10       Care plan reviewed with patient and she contributes to goal setting and voices understanding of plan of care.

## 2019-04-06 NOTE — LACTATION NOTE
Pt states breastfeeding is going well. Discussed ways to get a deeper latch and various positions. Encouraged Pt to attend support group or call with any questions at all. Will follow up PRN.

## 2021-07-21 ENCOUNTER — HOSPITAL ENCOUNTER (EMERGENCY)
Age: 31
Discharge: HOME | End: 2021-07-21
Payer: COMMERCIAL

## 2021-07-21 VITALS
HEART RATE: 80 BPM | BODY MASS INDEX: 27.44 KG/M2 | OXYGEN SATURATION: 99 % | RESPIRATION RATE: 16 BRPM | TEMPERATURE: 98.6 F | SYSTOLIC BLOOD PRESSURE: 143 MMHG | DIASTOLIC BLOOD PRESSURE: 73 MMHG | WEIGHT: 170 LBS

## 2021-07-21 DIAGNOSIS — K04.7 DENTAL INFECTION: Primary | ICD-10-CM

## 2021-07-21 DIAGNOSIS — K00.7 TOOTH ERUPTION: ICD-10-CM

## 2021-07-21 PROCEDURE — 99213 OFFICE O/P EST LOW 20 MIN: CPT

## 2021-07-21 PROCEDURE — 99202 OFFICE O/P NEW SF 15 MIN: CPT | Performed by: NURSE PRACTITIONER

## 2021-07-21 RX ORDER — CHLORHEXIDINE GLUCONATE 0.12 MG/ML
15 RINSE ORAL 2 TIMES DAILY
Qty: 420 ML | Refills: 0 | Status: SHIPPED | OUTPATIENT
Start: 2021-08-04 | End: 2021-08-18

## 2021-07-21 RX ORDER — LIDOCAINE HYDROCHLORIDE 20 MG/ML
5 SOLUTION OROPHARYNGEAL
Qty: 100 ML | Refills: 0 | Status: SHIPPED | OUTPATIENT
Start: 2021-07-21

## 2021-07-21 RX ORDER — CLINDAMYCIN HYDROCHLORIDE 300 MG/1
300 CAPSULE ORAL 3 TIMES DAILY
Qty: 30 CAPSULE | Refills: 0 | Status: SHIPPED | OUTPATIENT
Start: 2021-07-21 | End: 2021-07-31

## 2021-07-21 RX ORDER — ONDANSETRON 4 MG/1
4 TABLET, ORALLY DISINTEGRATING ORAL EVERY 8 HOURS PRN
Qty: 15 TABLET | Refills: 0 | Status: SHIPPED | OUTPATIENT
Start: 2021-07-21

## 2021-07-21 ASSESSMENT — PAIN DESCRIPTION - PROGRESSION: CLINICAL_PROGRESSION: GRADUALLY WORSENING

## 2021-07-21 ASSESSMENT — PAIN DESCRIPTION - LOCATION: LOCATION: TEETH

## 2021-07-21 ASSESSMENT — PAIN DESCRIPTION - FREQUENCY: FREQUENCY: INTERMITTENT

## 2021-07-21 ASSESSMENT — PAIN DESCRIPTION - DESCRIPTORS: DESCRIPTORS: ACHING;SHOOTING

## 2021-07-21 ASSESSMENT — ENCOUNTER SYMPTOMS
APNEA: 0
STRIDOR: 0
FACIAL SWELLING: 0
SHORTNESS OF BREATH: 0
CHEST TIGHTNESS: 0
CHOKING: 0
TRISMUS: 0
WHEEZING: 0
COUGH: 0

## 2021-07-21 ASSESSMENT — PAIN - FUNCTIONAL ASSESSMENT: PAIN_FUNCTIONAL_ASSESSMENT: PREVENTS OR INTERFERES SOME ACTIVE ACTIVITIES AND ADLS

## 2021-07-21 ASSESSMENT — PAIN DESCRIPTION - ONSET: ONSET: SUDDEN

## 2021-07-21 ASSESSMENT — PAIN DESCRIPTION - PAIN TYPE: TYPE: ACUTE PAIN

## 2021-07-21 ASSESSMENT — PAIN DESCRIPTION - ORIENTATION: ORIENTATION: LEFT;LOWER

## 2021-07-21 ASSESSMENT — PAIN SCALES - GENERAL: PAINLEVEL_OUTOF10: 8

## 2021-07-21 NOTE — ED PROVIDER NOTES
[place] [address]  Urgent Care Encounter      CHIEF COMPLAINT       Chief Complaint   Patient presents with    Dental Pain     left, lower    Nausea       Nurses Notes reviewed and I agree except as noted in the HPI. HISTORY OFPRESENT ILLNESS   Errol Mitchell is a 27 y.o. The history is provided by the patient. No  was used. Dental Pain  Location:  Lower  Lower teeth location:  17/LL 3rd molar and 18/LL 2nd molar  Quality:  Throbbing, pulsating, pressure-like, aching and constant  Severity:  Severe  Onset quality:  Sudden  Duration:  2 days  Timing:  Constant  Progression:  Worsening  Chronicity:  New  Context: intrusion and malocclusion    Context: not abscess, cap still on, not crown fracture, not dental caries, not dental fracture, not enamel fracture, filling intact, normal dentition, not recent dental surgery and not trauma    Relieved by:  Nothing  Worsened by:  Cold food/drink, hot food/drink, touching, jaw movement and pressure  Ineffective treatments:  Acetaminophen and NSAIDs  Associated symptoms: no congestion, no difficulty swallowing, no drooling, no facial pain, no facial swelling, no fever, no gum swelling, no headaches, no neck pain, no neck swelling, no oral bleeding, no oral lesions and no trismus    Risk factors: no alcohol problem, no cancer, no chewing tobacco use, no diabetes, no immunosuppression, sufficient dental care, no periodontal disease and no smoking        REVIEW OF SYSTEMS     Review of Systems   Constitutional: Negative for activity change, appetite change, chills, diaphoresis, fatigue and fever. HENT: Negative for congestion, drooling, facial swelling and mouth sores. Respiratory: Negative for apnea, cough, choking, chest tightness, shortness of breath, wheezing and stridor. Cardiovascular: Negative for chest pain, palpitations and leg swelling. Musculoskeletal: Negative for neck pain.    Neurological: Negative for dizziness, light-headedness and headaches. PAST MEDICAL HISTORY         Diagnosis Date    Anemia     on iron    Fibroid tumor breast     Hypertension     off work at 37 wks on no meds       SURGICAL HISTORY     Patient  has no past surgical history on file. CURRENT MEDICATIONS       Discharge Medication List as of 7/21/2021 10:45 AM      CONTINUE these medications which have NOT CHANGED    Details   acetaminophen (TYLENOL) 500 MG tablet Take 500 mg by mouth every 6 hours as needed for Pain. ALLERGIES     Patient is has No Known Allergies. FAMILY HISTORY     Patient's family history is not on file. SOCIAL HISTORY     Patient  reports that she has never smoked. She has never used smokeless tobacco. She reports that she does not drink alcohol and does not use drugs. PHYSICAL EXAM     ED TRIAGE VITALS  BP: (!) 143/73, Temp: 98.6 °F (37 °C), Pulse: 80, Resp: 16, SpO2: 99 %  Physical Exam  Vitals and nursing note reviewed. Constitutional:       General: She is awake. She is not in acute distress. Appearance: Normal appearance. She is normal weight. She is not ill-appearing, toxic-appearing or diaphoretic. HENT:      Head: Normocephalic and atraumatic. Right Ear: External ear normal.      Left Ear: External ear normal.      Mouth/Throat:      Lips: Pink. Mouth: Mucous membranes are moist.      Dentition: Abnormal dentition. Dental tenderness, gingival swelling and dental caries present. Eyes:      Extraocular Movements: Extraocular movements intact. Conjunctiva/sclera: Conjunctivae normal.   Pulmonary:      Effort: Pulmonary effort is normal.   Musculoskeletal:         General: Normal range of motion. Cervical back: Normal range of motion. Skin:     General: Skin is warm. Neurological:      General: No focal deficit present. Mental Status: She is alert and oriented to person, place, and time.    Psychiatric:         Mood and Affect: Mood normal. Behavior: Behavior normal. Behavior is cooperative. Thought Content: Thought content normal.         Judgment: Judgment normal.         DIAGNOSTIC RESULTS   Labs:No results found for this visit on 07/21/21. IMAGING:  No orders to display     URGENT CARE COURSE:     Vitals:    07/21/21 1031   BP: (!) 143/73   Pulse: 80   Resp: 16   Temp: 98.6 °F (37 °C)   TempSrc: Temporal   SpO2: 99%   Weight: 170 lb (77.1 kg)       Medications - No data to display  PROCEDURES:  None  FINAL IMPRESSION      1. Dental infection    2. Tooth eruption        DISPOSITION/PLAN   Decision To Discharge         The patient was also advised to eat a soft diet, chew on the opposite side of pain, Use a soft bristle toothbrush and warm saltwater lavage after eating. The patient was advised to take the medication as directed. The patient was also advised to follow-up with a local dentist ASAP. We also discussed returning to the Emergency Department immediately if new or worsening symptoms occur. We have discussed the symptoms which are most concerning that necessitate immediate return.           PATIENT REFERRED TO:  Mortimer Force, MD  [facility] [address] [phone]    Call   As needed    DISCHARGE MEDICATIONS:  Discharge Medication List as of 7/21/2021 10:45 AM      START taking these medications    Details   ondansetron (ZOFRAN ODT) 4 MG disintegrating tablet Take 1 tablet by mouth every 8 hours as needed for Nausea or Vomiting, Disp-15 tablet, R-0Normal      lidocaine viscous hcl (XYLOCAINE) 2 % SOLN solution Take 5 mLs by mouth every 3 hours as needed for Irritation or Pain, Disp-100 mL, R-0Normal      clindamycin (CLEOCIN) 300 MG capsule Take 1 capsule by mouth 3 times daily for 10 days, Disp-30 capsule, R-0Normal      chlorhexidine (PERIDEX) 0.12 % solution Take 15 mLs by mouth 2 times daily for 14 days, Disp-420 mL, R-0Normal           Discharge Medication List as of 7/21/2021 10:45 AM JUNIOR Terry - JUNIOR Choe CNP  07/21/21 1044

## 2021-07-21 NOTE — ED TRIAGE NOTES
Patient to room with c/o left, lower dental pain beginning two days ago. C/o nausea and vomiting beginning yesterday.

## 2022-05-06 ENCOUNTER — HOSPITAL ENCOUNTER (EMERGENCY)
Age: 32
Discharge: HOME | End: 2022-05-06
Payer: COMMERCIAL

## 2022-05-06 VITALS
RESPIRATION RATE: 16 BRPM | OXYGEN SATURATION: 97 % | HEIGHT: 66 IN | WEIGHT: 155 LBS | TEMPERATURE: 98.6 F | SYSTOLIC BLOOD PRESSURE: 125 MMHG | HEART RATE: 90 BPM | BODY MASS INDEX: 24.91 KG/M2 | DIASTOLIC BLOOD PRESSURE: 81 MMHG

## 2022-05-06 DIAGNOSIS — L03.031 CELLULITIS OF GREAT TOE OF RIGHT FOOT: Primary | ICD-10-CM

## 2022-05-06 PROCEDURE — 99213 OFFICE O/P EST LOW 20 MIN: CPT

## 2022-05-06 PROCEDURE — 99213 OFFICE O/P EST LOW 20 MIN: CPT | Performed by: NURSE PRACTITIONER

## 2022-05-06 RX ORDER — CEPHALEXIN 500 MG/1
500 CAPSULE ORAL 3 TIMES DAILY
Qty: 21 CAPSULE | Refills: 0 | Status: SHIPPED | OUTPATIENT
Start: 2022-05-06 | End: 2022-05-13

## 2022-05-06 ASSESSMENT — PAIN DESCRIPTION - ORIENTATION: ORIENTATION: RIGHT

## 2022-05-06 ASSESSMENT — PAIN - FUNCTIONAL ASSESSMENT
PAIN_FUNCTIONAL_ASSESSMENT: 0-10
PAIN_FUNCTIONAL_ASSESSMENT: PREVENTS OR INTERFERES SOME ACTIVE ACTIVITIES AND ADLS

## 2022-05-06 ASSESSMENT — PAIN DESCRIPTION - PAIN TYPE: TYPE: ACUTE PAIN

## 2022-05-06 ASSESSMENT — PAIN DESCRIPTION - FREQUENCY: FREQUENCY: CONTINUOUS

## 2022-05-06 ASSESSMENT — PAIN DESCRIPTION - DESCRIPTORS: DESCRIPTORS: ACHING;SORE

## 2022-05-06 ASSESSMENT — PAIN DESCRIPTION - LOCATION: LOCATION: TOE (COMMENT WHICH ONE)

## 2022-05-06 ASSESSMENT — PAIN SCALES - GENERAL: PAINLEVEL_OUTOF10: 4

## 2022-05-06 ASSESSMENT — PAIN DESCRIPTION - ONSET: ONSET: GRADUAL

## 2022-05-06 NOTE — ED TRIAGE NOTES
Pt to [facility] ambulatory with right great toe pain. Pt has redness and swelling noted to right great toe.

## 2022-05-06 NOTE — ED PROVIDER NOTES
[facility]       CHIEF COMPLAINT       Chief Complaint   Patient presents with    Toe Pain     right great toe       Nurses Notes reviewed and I agree except as noted in the HPI. HISTORY OF PRESENT ILLNESS   Lobo Manley is a 32 y.o. female who presents with complaints of right great toe pain, swelling, redness. Her symptoms started Wednesday night and worsened yesterday. She states this morning it is starting to get worse. She did try soaking it which did help. She admits to a small amount of drainage. She denies any numbness or tingling or radiation of pain. There is swelling and redness mainly at the base of the nailbed. She thought it was from a ingrown toenail. Walking on her foot makes it worse. The history is provided by the patient. REVIEW OF SYSTEMS     Review of Systems   Constitutional: Negative for fever. Cardiovascular: Negative for leg swelling. Musculoskeletal: Positive for arthralgias (right great toe distal phalanx) and myalgias. Negative for joint swelling. Skin: Negative for wound. Neurological: Negative for weakness and numbness. PAST MEDICAL HISTORY         Diagnosis Date    Anemia     on iron    Fibroid tumor breast     Hypertension     off work at 37 wks on no meds       SURGICALHISTORY     Patient  has no past surgical history on file. CURRENT MEDICATIONS       Previous Medications    ACETAMINOPHEN (TYLENOL) 500 MG TABLET    Take 500 mg by mouth every 6 hours as needed for Pain. LIDOCAINE VISCOUS HCL (XYLOCAINE) 2 % SOLN SOLUTION    Take 5 mLs by mouth every 3 hours as needed for Irritation or Pain    ONDANSETRON (ZOFRAN ODT) 4 MG DISINTEGRATING TABLET    Take 1 tablet by mouth every 8 hours as needed for Nausea or Vomiting       ALLERGIES     Patient is has No Known Allergies. Patients There is no immunization history for the selected administration types on file for this patient.     FAMILY HISTORY     Patient's family history is not on file. SOCIAL HISTORY     Patient  reports that she has never smoked. She has never used smokeless tobacco. She reports that she does not drink alcohol and does not use drugs. PHYSICAL EXAM     ED TRIAGE VITALS  BP: 125/81, Temp: 98.6 °F (37 °C), Pulse: 90, Resp: 16, SpO2: 97 %,Estimated body mass index is 25.02 kg/m² as calculated from the following:    Height as of this encounter: 5' 6\" (1.676 m). Weight as of this encounter: 155 lb (70.3 kg). ,Patient's last menstrual period was 04/03/2022. Physical Exam  Vitals and nursing note reviewed. Constitutional:       General: She is not in acute distress. Cardiovascular:      Rate and Rhythm: Normal rate. Pulses: Normal pulses. Pulmonary:      Effort: Pulmonary effort is normal.   Musculoskeletal:         General: Swelling and tenderness (right great toe) present. Skin:     General: Skin is warm and dry. Findings: Erythema (right great toe nailbed) present. Neurological:      Mental Status: She is alert and oriented to person, place, and time. Motor: No weakness. Psychiatric:         Behavior: Behavior normal.       DIAGNOSTIC RESULTS     Labs:No results found for this visit on 05/06/22. IMAGING:  None    EKG:  None    URGENT CARE COURSE:     Vitals:    05/06/22 0814   BP: 125/81   Pulse: 90   Resp: 16   Temp: 98.6 °F (37 °C)   TempSrc: Temporal   SpO2: 97%   Weight: 155 lb (70.3 kg)   Height: 5' 6\" (1.676 m)       Medications - No data to display       PROCEDURES:  None    FINAL IMPRESSION      1. Cellulitis of great toe of right foot      DISPOSITION/ PLAN   DISPOSITION Decision To Discharge 05/06/2022 08:36:02 AM     Clinical exam consistent with cellulitis of the right great toe. Will start patient on Keflex for the next 7 days. Advised she may also apply Bactroban twice daily for the next few days as well. Continue warm soaks a few times a day and as needed.   Follow-up with PCP if worsens or fails to improve. Patient voiced understanding was agreeable with above-mentioned plan. Patient was discharged in stable condition. PATIENT REFERRED TO:  Annie Willoughby MD  [address] [facility] [address] [address]      DISCHARGE MEDICATIONS:  New Prescriptions    CEPHALEXIN (KEFLEX) 500 MG CAPSULE    Take 1 capsule by mouth 3 times daily for 7 days    MUPIROCIN (BACTROBAN) 2 % OINTMENT    Apply topically 2 times daily.        Discontinued Medications    No medications on file       Current Discharge Medication List          JUNIOR John CNP    (Please note that portions of this note were completed with a voice recognition program. Efforts were made to edit the dictations but occasionally words are mis-transcribed.)            JUNIOR John CNP  05/06/22 2195

## 2022-07-14 ENCOUNTER — HOSPITAL ENCOUNTER (EMERGENCY)
Age: 32
Discharge: HOME | End: 2022-07-14
Attending: EMERGENCY MEDICINE
Payer: COMMERCIAL

## 2022-07-14 ENCOUNTER — APPOINTMENT (OUTPATIENT)
Dept: GENERAL RADIOLOGY | Age: 32
End: 2022-07-14
Payer: COMMERCIAL

## 2022-07-14 VITALS
DIASTOLIC BLOOD PRESSURE: 108 MMHG | SYSTOLIC BLOOD PRESSURE: 163 MMHG | OXYGEN SATURATION: 97 % | TEMPERATURE: 98.6 F | RESPIRATION RATE: 18 BRPM | HEART RATE: 89 BPM

## 2022-07-14 DIAGNOSIS — R07.89 ATYPICAL CHEST PAIN: Primary | ICD-10-CM

## 2022-07-14 PROCEDURE — 71045 X-RAY EXAM CHEST 1 VIEW: CPT

## 2022-07-14 PROCEDURE — 93005 ELECTROCARDIOGRAM TRACING: CPT | Performed by: EMERGENCY MEDICINE

## 2022-07-14 PROCEDURE — 99283 EMERGENCY DEPT VISIT LOW MDM: CPT

## 2022-07-15 LAB
EKG ATRIAL RATE: 86 BPM
EKG P AXIS: 59 DEGREES
EKG P-R INTERVAL: 142 MS
EKG Q-T INTERVAL: 372 MS
EKG QRS DURATION: 86 MS
EKG QTC CALCULATION (BAZETT): 445 MS
EKG R AXIS: 55 DEGREES
EKG T AXIS: 56 DEGREES
EKG VENTRICULAR RATE: 86 BPM

## 2022-07-15 PROCEDURE — 93010 ELECTROCARDIOGRAM REPORT: CPT | Performed by: INTERNAL MEDICINE

## 2022-07-15 NOTE — ED NOTES
Pt to the ED via self with family. Patient presents with complaints of chest pain that has been going on since may. Patient states that she recently had a friend in their 35s that passed away and it made her anxious. EKG done. Patient is alert and oriented x 4. Respirations are regular and unlabored. Family at the bedside and call light within reach.      Piyush Tarango RN  07/14/22 5763

## 2022-07-15 NOTE — ED PROVIDER NOTES
[facility]  EMERGENCY DEPARTMENT ENCOUNTER      PATIENT NAME: Emily Bowling  MRN: [id number]  : 1990  BUCIO: 2022  PROVIDER: Gabby Mcdaniel MD      CHIEF COMPLAINT       Chief Complaint   Patient presents with    Chest Pain       Patient is seen and evaluated in a timely fashion. Nurses Notes are reviewed and I agree except as noted in the HPI. HISTORY OF PRESENT ILLNESS    Emily Bowling is a 32 y.o. female who presents to Emergency Department with Chest Pain     Chest pain on and off for the last 2 weeks. Chest pain was from the left mid chest.  Chest pain lasted for 1-2 minutes. Chest pain could happen at any time. Pain was sharp, nonradiating. Currently she is pain-free. No diaphoresis, no shortness of breath, no dizziness, no right-sided chest pain. No history of DVT or PE. Not on birth control occasions. This HPI was provided by patient. REVIEW OF SYSTEMS   Ten-point review of systems is negative except those documented in above HPI including constitutional, HEENT, respiratory, cardiovascular, gastrointestinal, genitourinary, musculoskeletal, skin, neurological, hematological and behavioral.      PAST MEDICAL HISTORY    has a past medical history of Anemia, Fibroid tumor breast, and Hypertension. SURGICAL HISTORY      has no past surgical history on file. CURRENT MEDICATIONS       Previous Medications    ACETAMINOPHEN (TYLENOL) 500 MG TABLET    Take 500 mg by mouth every 6 hours as needed for Pain. LIDOCAINE VISCOUS HCL (XYLOCAINE) 2 % SOLN SOLUTION    Take 5 mLs by mouth every 3 hours as needed for Irritation or Pain    MUPIROCIN (BACTROBAN) 2 % OINTMENT    Apply topically 2 times daily. ONDANSETRON (ZOFRAN ODT) 4 MG DISINTEGRATING TABLET    Take 1 tablet by mouth every 8 hours as needed for Nausea or Vomiting       ALLERGIES     has No Known Allergies. FAMILY HISTORY     She indicated that her mother is alive.  She indicated that her father is alive.   family history is not on file. SOCIAL HISTORY      reports that she has never smoked. She has never used smokeless tobacco. She reports that she does not drink alcohol and does not use drugs. PHYSICAL EXAM      oral temperature is 98.6 °F (37 °C). Her blood pressure is 163/108 (abnormal) and her pulse is 89. Her respiration is 18 and oxygen saturation is 97%. Physical Exam  Vitals and nursing note reviewed. Constitutional:       Appearance: She is well-developed. She is not diaphoretic. HENT:      Head: Normocephalic and atraumatic. Nose: Nose normal.   Eyes:      General: No scleral icterus. Right eye: No discharge. Left eye: No discharge. Conjunctiva/sclera: Conjunctivae normal.      Pupils: Pupils are equal, round, and reactive to light. Neck:      Vascular: No JVD. Trachea: No tracheal deviation. Cardiovascular:      Rate and Rhythm: Normal rate and regular rhythm. Heart sounds: Normal heart sounds. No murmur heard. No friction rub. No gallop. Pulmonary:      Effort: Pulmonary effort is normal. No respiratory distress. Breath sounds: Normal breath sounds. No stridor. No wheezing or rales. Chest:      Chest wall: No tenderness. Abdominal:      General: Bowel sounds are normal. There is no distension. Palpations: Abdomen is soft. There is no mass. Tenderness: There is no abdominal tenderness. There is no guarding or rebound. Hernia: No hernia is present. Musculoskeletal:         General: No tenderness or deformity. Cervical back: Normal range of motion and neck supple. Lymphadenopathy:      Cervical: No cervical adenopathy. Skin:     General: Skin is warm and dry. Capillary Refill: Capillary refill takes less than 2 seconds. Coloration: Skin is not pale. Findings: No erythema or rash. Neurological:      Mental Status: She is alert and oriented to person, place, and time. Cranial Nerves:  No

## 2022-12-29 NOTE — ANESTHESIA POSTPROCEDURE EVALUATION
Department of Anesthesiology  Postprocedure Note    Patient: Rossi Del Cid  MRN: [id number]  YOB: 1990  Date of evaluation: 4/5/2019  Time:  9:08 AM     Procedure Summary     Date:  04/04/19 Room / Location:      Anesthesia Start:  1105 Anesthesia Stop:  1504    Procedure:  ANESTHESIA LABOR ANALGESIA Diagnosis:      Scheduled Providers:   Responsible Provider:  Casa Hernandez DO    Anesthesia Type:  epidural ASA Status:  2          Anesthesia Type: No value filed. Sarah Phase I: Sarah Score: 10    Sarah Phase II: Sarah Score: 10    Last vitals: Reviewed and per EMR flowsheets.        Anesthesia Post Evaluation    Patient location during evaluation: bedside  Patient participation: complete - patient participated  Level of consciousness: sleepy but conscious  Airway patency: patent  Nausea & Vomiting: no nausea and no vomiting  Complications: no  Cardiovascular status: hemodynamically stable  Respiratory status: acceptable, spontaneous ventilation and nasal cannula  Hydration status: stable Detail Level: Zone Detail Level: Detailed

## 2024-02-25 NOTE — PLAN OF CARE
Problem: Fluid Volume - Imbalance:  Goal: Absence of postpartum hemorrhage signs and symptoms  Description  Absence of postpartum hemorrhage signs and symptoms  Outcome: Ongoing  Note:   Minimal bleeding noted     Problem: Discharge Planning:  Goal: Discharged to appropriate level of care  Description  Discharged to appropriate level of care  Outcome: Ongoing  Note:   Plan of care discussed     Problem: Mood - Altered:  Goal: Mood stable  Description  Mood stable  Outcome: Ongoing  Note:   Bonding well with infant     Problem: Venous Thromboembolism:  Goal: Absence of signs or symptoms of impaired coagulation  Description  Absence of signs or symptoms of impaired coagulation  Outcome: Ongoing  Note:   [facility]   Care plan reviewed with patient. Patient verbalize understanding of the plan of care and contribute to goal setting. Improved

## 2024-08-26 ENCOUNTER — HOSPITAL ENCOUNTER (EMERGENCY)
Age: 34
Discharge: HOME | End: 2024-08-26
Payer: COMMERCIAL

## 2024-08-26 VITALS
TEMPERATURE: 98 F | OXYGEN SATURATION: 98 % | SYSTOLIC BLOOD PRESSURE: 156 MMHG | RESPIRATION RATE: 17 BRPM | HEART RATE: 87 BPM | DIASTOLIC BLOOD PRESSURE: 104 MMHG

## 2024-08-26 DIAGNOSIS — R03.0 ELEVATED BLOOD PRESSURE READING WITHOUT DIAGNOSIS OF HYPERTENSION: Primary | ICD-10-CM

## 2024-08-26 DIAGNOSIS — F41.1 ANXIETY STATE: ICD-10-CM

## 2024-08-26 LAB
EKG ATRIAL RATE: 89 BPM
EKG P AXIS: 43 DEGREES
EKG P-R INTERVAL: 150 MS
EKG Q-T INTERVAL: 368 MS
EKG QRS DURATION: 98 MS
EKG QTC CALCULATION (BAZETT): 447 MS
EKG R AXIS: 37 DEGREES
EKG T AXIS: 50 DEGREES
EKG VENTRICULAR RATE: 89 BPM

## 2024-08-26 PROCEDURE — 99283 EMERGENCY DEPT VISIT LOW MDM: CPT

## 2024-08-26 PROCEDURE — 93005 ELECTROCARDIOGRAM TRACING: CPT | Performed by: PHYSICIAN ASSISTANT

## 2024-08-26 PROCEDURE — 93010 ELECTROCARDIOGRAM REPORT: CPT | Performed by: NUCLEAR MEDICINE

## 2024-08-26 RX ORDER — BUPROPION HYDROCHLORIDE 150 MG/1
150 TABLET ORAL EVERY MORNING
COMMUNITY

## 2024-08-26 RX ORDER — BUSPIRONE HYDROCHLORIDE 5 MG/1
7.5 TABLET ORAL 3 TIMES DAILY PRN
COMMUNITY

## 2024-08-26 ASSESSMENT — PAIN - FUNCTIONAL ASSESSMENT: PAIN_FUNCTIONAL_ASSESSMENT: NONE - DENIES PAIN

## 2024-08-26 ASSESSMENT — ENCOUNTER SYMPTOMS: RESPIRATORY NEGATIVE: 1

## 2024-08-26 NOTE — DISCHARGE INSTRUCTIONS
Take Medications as prescribed by your PCP. Monitor BP twice a day so your PCP can manage closely.

## 2024-08-26 NOTE — ED NOTES
Presents to ED with s/o with complaints of new onset of hypertension. Pt reports she was went to her PCP and noted her BP to be high. Her PCP diagnosed her with anxiety and prescribed 2 anxiety medications. Pt reports she took them today, but her BP still remains high. She states her PCP prescribed lisinopril today, but instructed pt to come to ED for further evaluation.

## 2024-08-27 NOTE — ED PROVIDER NOTES
XL) 150 MG extended release tablet, Take 1 tablet by mouth every morning, Disp: , Rfl:     busPIRone (BUSPAR) 5 MG tablet, Take 1.5 tablets by mouth 3 times daily as needed (as needed), Disp: , Rfl:     mupirocin (BACTROBAN) 2 % ointment, Apply topically 2 times daily., Disp: 15 g, Rfl: 0    ondansetron (ZOFRAN ODT) 4 MG disintegrating tablet, Take 1 tablet by mouth every 8 hours as needed for Nausea or Vomiting, Disp: 15 tablet, Rfl: 0    lidocaine viscous hcl (XYLOCAINE) 2 % SOLN solution, Take 5 mLs by mouth every 3 hours as needed for Irritation or Pain, Disp: 100 mL, Rfl: 0    acetaminophen (TYLENOL) 500 MG tablet, Take 500 mg by mouth every 6 hours as needed for Pain., Disp: , Rfl:     ALLERGIES    has No Known Allergies.    FAMILY HISTORY    She indicated that her mother is alive. She indicated that her father is alive.   family history is not on file.    SOCIAL HISTORY     reports that she has never smoked. She has never used smokeless tobacco. She reports that she does not drink alcohol and does not use drugs.    PHYSICAL EXAM    INITIAL VITALS:  oral temperature is 98 °F (36.7 °C). Her blood pressure is 156/104 (abnormal) and her pulse is 87. Her respiration is 17 and oxygen saturation is 98%.    Physical Exam  Vitals reviewed.   Constitutional:       Appearance: Normal appearance.   HENT:      Head: Normocephalic.      Right Ear: Tympanic membrane normal.      Left Ear: Tympanic membrane normal.      Nose: Nose normal.      Mouth/Throat:      Mouth: Mucous membranes are moist.   Eyes:      Extraocular Movements: Extraocular movements intact.      Pupils: Pupils are equal, round, and reactive to light.   Cardiovascular:      Rate and Rhythm: Normal rate and regular rhythm.      Pulses: Normal pulses.      Heart sounds: Normal heart sounds.   Pulmonary:      Effort: Pulmonary effort is normal.      Breath sounds: Normal breath sounds.   Musculoskeletal:         General: Normal range of motion.      Cervical  leading to inappropriate words, phrases, or sentences.   This note is addressed to the requesting provider as a consultation for clinical guidance. Specific medical abbreviations are occasionally used and those are generally approved by the [employer] and appropriate medical abbreviations.  The above plan of care was based solely on the diagnoses for which a consultation was requested. More frequent testing may be indicated based on other chronic non-emergency medical problems. Medical management may be indicated beyond emergency medical care as addressed in this visit.    Note to patient: The Cares Act makes medical notes like these available to patients in the interest of transparency. However, be advised this is a medical document. It is intended as peer to peer communication. It is written in medical language and may contain abbreviations or verbiage that are unfamiliar. It may appear blunt or direct. Medical documents are intended to carry relevant information, facts as evident, and the clinical opinion of the practitioner.        Martin Wang PA-C  08/26/24 2031